# Patient Record
(demographics unavailable — no encounter records)

---

## 2024-11-08 NOTE — HISTORY OF PRESENT ILLNESS
[FreeTextEntry1] : CPE [de-identified] : This is a 27F with PMHx who seasonal allergies, who presents for CPE. Works in marketing. Has lived in the area for 8-9 years but originally from [place].  Has seasonal allergies and takes allegra.  Diet/exercise: Balanced diet. Exercise 4-5x/week. Social history: Live with boyfriend. EtOh 2x/week. No tobacco, occasinoal marijuana (edibles).  Sexually active/Last menstrual period: Amenable to STD testing. Get regular periods. Not on birth control.

## 2024-11-08 NOTE — ASSESSMENT
[FreeTextEntry1] : HEALTH CARE MAINTENANCE - Influenza vaccine: Today - Covid vaccine: Today - HIV: Today - HEP C: Today - TDAP: UTD - Pap smear: UTD 2023; WNL. Sees GYN [facility]

## 2024-11-08 NOTE — HEALTH RISK ASSESSMENT
[Very Good] : ~his/her~  mood as very good [Yes] : Yes [2 - 4 times a month (2 pts)] : 2-4 times a month (2 points) [1 or 2 (0 pts)] : 1 or 2 (0 points) [Never (0 pts)] : Never (0 points) [0] : 2) Feeling down, depressed, or hopeless: Not at all (0) [PHQ-2 Negative - No further assessment needed] : PHQ-2 Negative - No further assessment needed [Never] : Never [HIV Test offered] : HIV Test offered [Hepatitis C test offered] : Hepatitis C test offered [None] : None [With Significant Other] : lives with significant other [Employed] : employed [College] : College [Feels Safe at Home] : Feels safe at home [Significant Other] : lives with significant other [Fully functional (bathing, dressing, toileting, transferring, walking, feeding)] : Fully functional (bathing, dressing, toileting, transferring, walking, feeding) [Fully functional (using the telephone, shopping, preparing meals, housekeeping, doing laundry, using] : Fully functional and needs no help or supervision to perform IADLs (using the telephone, shopping, preparing meals, housekeeping, doing laundry, using transportation, managing medications and managing finances) [[id number]] : 0 [Reports changes in hearing] : Reports no changes in hearing [Reports changes in vision] : Reports no changes in vision [Reports changes in dental health] : Reports no changes in dental health [Seat Belt] :  uses seat belt [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